# Patient Record
Sex: MALE | Race: WHITE | NOT HISPANIC OR LATINO | Employment: FULL TIME | URBAN - METROPOLITAN AREA
[De-identification: names, ages, dates, MRNs, and addresses within clinical notes are randomized per-mention and may not be internally consistent; named-entity substitution may affect disease eponyms.]

---

## 2021-12-15 ENCOUNTER — HISTORICAL (OUTPATIENT)
Dept: RADIOLOGY | Facility: HOSPITAL | Age: 41
End: 2021-12-15

## 2023-11-28 DIAGNOSIS — M54.50 LOW BACK PAIN: Primary | ICD-10-CM

## 2023-11-28 DIAGNOSIS — M54.16 LUMBAR RADICULOPATHY: ICD-10-CM

## 2023-11-30 ENCOUNTER — TELEPHONE (OUTPATIENT)
Dept: NEUROSURGERY | Facility: HOSPITAL | Age: 43
End: 2023-11-30
Payer: OTHER GOVERNMENT

## 2023-11-30 NOTE — TELEPHONE ENCOUNTER
This patient is being referred to Dr. Cornell by Joaquin España NP for low back pain x2 1/2 weeks. This LBP is localized to lower back area only. He describes this pain as aching. It was of gradual onset not fawn to any accidents, but it has worsened over time. Pt states the pain is of mild-moderate intensity. He had a MRI Lumbar done at [facility] and those images are in his chart. He has had some PT done at [facility] RONNIE has been emailed. He has not had any surgeries or pain mgmt. He is now ready to be scheduled next available w/ Dr. Cornell. Pt is aware we are OON w/  .

## 2023-12-11 NOTE — TELEPHONE ENCOUNTER
I spoke with the patient in regards to the medical request form that is supposed to be emailed back. He stated he does have it printed so he just needs to sign it and scan it back to us. I did advise for him to get it to us at his soonest convenience so I can obtain PT notes prior to his upcoming appointment.

## 2023-12-14 ENCOUNTER — OFFICE VISIT (OUTPATIENT)
Dept: NEUROSURGERY | Facility: CLINIC | Age: 43
End: 2023-12-14
Payer: OTHER GOVERNMENT

## 2023-12-14 ENCOUNTER — HOSPITAL ENCOUNTER (OUTPATIENT)
Dept: RADIOLOGY | Facility: HOSPITAL | Age: 43
Discharge: HOME | End: 2023-12-14
Attending: NEUROLOGICAL SURGERY
Payer: OTHER GOVERNMENT

## 2023-12-14 VITALS
HEART RATE: 88 BPM | SYSTOLIC BLOOD PRESSURE: 125 MMHG | WEIGHT: 261 LBS | RESPIRATION RATE: 16 BRPM | HEIGHT: 69 IN | BODY MASS INDEX: 38.66 KG/M2 | DIASTOLIC BLOOD PRESSURE: 77 MMHG

## 2023-12-14 DIAGNOSIS — M54.41 CHRONIC BILATERAL LOW BACK PAIN WITH RIGHT-SIDED SCIATICA: ICD-10-CM

## 2023-12-14 DIAGNOSIS — M51.26 LUMBAR DISC HERNIATION: Primary | ICD-10-CM

## 2023-12-14 DIAGNOSIS — G89.29 CHRONIC BILATERAL LOW BACK PAIN WITH RIGHT-SIDED SCIATICA: ICD-10-CM

## 2023-12-14 DIAGNOSIS — M51.26 LUMBAR DISC HERNIATION: ICD-10-CM

## 2023-12-14 PROCEDURE — 72114 X-RAY EXAM L-S SPINE BENDING: CPT | Mod: TC

## 2023-12-14 PROCEDURE — 99204 OFFICE O/P NEW MOD 45 MIN: CPT | Mod: ,,, | Performed by: NEUROLOGICAL SURGERY

## 2023-12-14 PROCEDURE — 99204 PR OFFICE/OUTPT VISIT, NEW, LEVL IV, 45-59 MIN: ICD-10-PCS | Mod: ,,, | Performed by: NEUROLOGICAL SURGERY

## 2023-12-14 RX ORDER — BUPROPION HYDROCHLORIDE 100 MG/1
TABLET, FILM COATED ORAL
COMMUNITY
Start: 2021-12-10

## 2023-12-14 RX ORDER — ONDANSETRON 4 MG/1
4 TABLET, ORALLY DISINTEGRATING ORAL EVERY 6 HOURS PRN
COMMUNITY
Start: 2023-08-06 | End: 2023-12-14

## 2023-12-14 RX ORDER — AMOXICILLIN 875 MG/1
875 TABLET, FILM COATED ORAL 2 TIMES DAILY
COMMUNITY
Start: 2023-08-06 | End: 2023-12-14

## 2023-12-14 RX ORDER — NALTREXONE HYDROCHLORIDE AND BUPROPION HYDROCHLORIDE 8; 90 MG/1; MG/1
TABLET, EXTENDED RELEASE ORAL
COMMUNITY
Start: 2023-05-10

## 2023-12-14 NOTE — PROGRESS NOTES
[facility]   Neurosurgery      Lincoln Clint Cheng  MRN: [id number], CSN: [id number]      : 1980   Age: 43 y.o. male  Payor:  / Plan:  GENERIC EAST / Product Type: Government /       Ref:  Joaquin España NP  [address] [place] [address] [place] [id number]    PCP: Joaquin España NP    Visit Date: 2023     There is no problem list on file for this patient.      SUBJECTIVE:      CC:   Chief Complaint   Patient presents with    Intermittent low back pain with no pain today       HPI:   Mr. Cheng is a 43 y.o. male who has a past medical history significant for anxiety.  He presents as a new patient in the neurosurgery clinic for episodic low back pain with radiation down his right leg, but he does not have any discomfort today.    The patient visits the neurosurgery clinic with his wife.  Mr. Cheng reports that his first episode of severe low back pain occurred in  when he was lifting weights the gym.  He had low back pain for a few days, which improved after his primary care provider administered a cortisone shot.  Mr. Cheng did not return to the gym because the COVID epidemic followed shortly thereafter.  Approximately 1 year later in 2020, he started lifting again and after a dead lift, had significant low back pain with radiation down his right leg.  This pain was associated with right anterior hip pain, right leg numbness, subjective right leg weakness, and limping while walking.  In between these episodes of low back pain, Mr. Cheng has no discomfort, as is the case is today.  In the last year, he had 3 episodes of severe low back pain.  There have been no reports of bowel or bladder incontinence.    There is increased pain with lifting and impact exercises such as running.  There is a reduction of pain with stretching and when taking Robaxin.  The patient has also tried taking Aleve.  He participated in physical therapy at [facility] in [facility]  [facility] in 2020, but not more recently.  Mr. Cheng has not been evaluated by a spine surgeon or a pain management specialist in the past.      There are no problems to display for this patient.    Past Medical History:   Diagnosis Date    Anxiety disorder, unspecified     Low back pain     Lumbar radiculopathy      Past Surgical History:   Procedure Laterality Date    CYST REMOVAL         Current Outpatient Medications:     CONTRAVE 8-90 mg TbSR, , Disp: , Rfl:     WELLBUTRIN  mg TBSR 12 hr tablet, , Disp: , Rfl:     Review of patient's allergies indicates:  No Known Allergies    Social History     Tobacco Use    Smoking status: Never    Smokeless tobacco: Never   Substance Use Topics    Alcohol use: Not on file     Occupation: Principal at [employer], 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   HENT:  Negative for congestion and sore throat.    Eyes:  Negative for blurred vision and double vision.   Respiratory:  Negative for cough and shortness of breath.    Cardiovascular:  Negative for chest pain and palpitations.   Gastrointestinal:  Negative for constipation, diarrhea, nausea and vomiting.   Musculoskeletal:  Positive for back pain, Negative for neck pain.   Neurological:  Positive for sensory change and focal weakness, Negative for headaches.   Endo/Heme/Allergies:  Does not bruise/bleed easily.   Psychiatric/Behavioral:  Positive for anxiety, Negative for depression.      OBJECTIVE:     EXAMINATION:  /77   Pulse 88   Resp 16   Ht 5' 9" (1.753 m)   Wt 118.4 kg (261 lb)   BMI 38.54 kg/m²   Body Habitus: Significantly Obese    Physical Exam:  Constitutional: The patient is well-developed and cooperative, sitting comfortably in a chair    Mental Status:   Oriented to person, place, and time  Normal " speech    Cranial nerves:  CN II: PERRL, 4 to 3 mm, brisk bilaterally  CN III, IV, and VI: extraocular movements normal, no ptosis  CN V: normal facial sensation and masseter function  CN VII: facial strength normal and symmetrical  CN VIII: hearing normal bilaterally  CN IX and X: swallowing and phonation normal  CN XI: shoulder shrug intact bilaterally  CN XII: tongue protrusion midline    Motor:  Muscle bulk: normal in all extremities  Tone: normal in all extremities    Upper extremities:  Deltoid: right 5/5; left 5/5  Biceps: right 5/5; left 5/5  Triceps: right 5/5; left 5/5  : right 5/5; left 5/5  Interosseous muscles: right 5/5; left 5/5    Lower extremities:  Hip flexors: right 5/5; left 5/5  Knee extensors: right 5/5; left 5/5  Knee flexors: right 5/5; left 5/5  Foot dorsiflexors: right 5/5; left 5/5  Foot plantar flexors: right 5/5; left 5/5  Extensor hallucis longus: right 5/5; left 5/5    Sensation:  Normal to light touch x 4 extremities    Reflexes:  Biceps: right 0/4; left 0/4  Brachioradialis: right 0/4; left 0/4  Triceps: right 0/4; left 0/4  Knee: right 0/4; left 0/4  Ankle: right 0/4; left 0/4    Dooleys sign: right negative; left negative  Babinski: right downgoing; left downgoing  Clonus: right negative; left negative    Coordination:  Finger to nose: right normal; left normal    Musculoskeletal:    Gait:  Toe walk- normal  Heel walk- normal  Tandem gait- normal    Straight leg test: right negative; left negative    Cervical: No pain with palpation  Upper back: No pain with palpation  Lower back: No pain with palpation      DIAGNOSTICS REVIEW OF IMAGING, LAB & OTHER STUDIES:  I have personally reviewed and evaluated the following reports as well as radiographic studies:    MRI lumbar spine without gadolinium, 10/31/2023- there is normal alignment.  At L3-4, there is mild right neuroforaminal narrowing.  At L4-5, there is a mild right disc bulge with mild right neuroforaminal  narrowing.      ASSESSMENT:  Mr. Cheng is a 43 y.o. male who has a past medical history significant for anxiety.  He presents as a new patient in the neurosurgery clinic for episodic low back pain with radiation down his right leg, but he does not have any discomfort today.  Mr. Cheng has a normal motor and sensory neurological exam.    I reviewed pertinent imaging studies with the patient and his wife.  A MRI lumbar spine without gadolinium demonstrates normal alignment.  At L3-4, there is mild right neuroforaminal narrowing.  At L4-5, there is a mild right disc bulge with mild right neuroforaminal narrowing.        PLAN:    Encounter Diagnoses   Name Primary?    Lumbar disc herniation Yes    Chronic bilateral low back pain with right-sided sciatica      Orders Placed This Encounter   Procedures    X-Ray Lumbar Complete Including Flex And Ext    Ambulatory referral/consult to Physical/Occupational Therapy        1. I discussed with Mr. Cheng that continued optimization of medical management is recommended for his episodic low back pain, which is attributed to musculoskeletal spasms and likely lumbar nerve root irritation from a right L4-5 herniated disc.  He is very agreeable to this nonsurgical plan of care.      2.  To further evaluate his spinal anatomy, I am ordering lumbar spine x-rays with AP lateral and flexion-extension views. The patient will be contacted with these radiographic results and any updates to the plan of care.    3.  Mr. Cheng is being referred to physical therapy at [facility] in [place], [place].    4.   A consideration will be made to refer Mr. Cheng to a pain management specialist for evaluation and consideration of lumbar epidural steroid injection for an acute episode of low back pain.  It is noted that his referral will need to be submitted to a clinic that is within a network for [facility].      5.  The patient's BMI is elevated at 38.54.   He is currently taking  Contrave.  We discussed weight loss goals with diet and exercise.  Maintaining a healthy weight and strengthening core muscles are important factors for reducing pain along the spine.     6.  Mr. Cheng is encouraged to follow up in the neurosurgery clinic on an as-needed basis for any concerning changes in condition or symptoms.        This note will be sent to the patient's referring provider Joaquin España and primary care provider Joaquin España NP.           Natalie Cornell MD  Neurosurgeon

## 2023-12-14 NOTE — PATIENT INSTRUCTIONS
Mr. Cheng is a 43 y.o. male who has a past medical history significant for anxiety.  He presents as a new patient in the neurosurgery clinic for episodic low back pain with radiation down his right leg, but he does not have any discomfort today.  Mr. Cheng has a normal motor and sensory neurological exam.    I reviewed pertinent imaging studies with the patient and his wife.  A MRI lumbar spine without gadolinium demonstrates normal alignment.  At L3-4, there is mild right neuroforaminal narrowing.  At L4-5, there is a mild right disc bulge with mild right neuroforaminal narrowing.        PLAN:    Encounter Diagnoses   Name Primary?    Lumbar disc herniation Yes    Chronic bilateral low back pain with right-sided sciatica      Orders Placed This Encounter   Procedures    X-Ray Lumbar Complete Including Flex And Ext    Ambulatory referral/consult to Physical/Occupational Therapy        1. I discussed with Mr. Cheng that continued optimization of medical management is recommended for his episodic low back pain, which is attributed to musculoskeletal spasms and likely lumbar nerve root irritation from a right L4-5 herniated disc.  He is very agreeable to this nonsurgical plan of care.      2.  To further evaluate his spinal anatomy, I am ordering lumbar spine x-rays with AP lateral and flexion-extension views. The patient will be contacted with these radiographic results and any updates to the plan of care.    3.  Mr. Cheng is being referred to physical therapy at [facility] in [place], [place].    4.   A consideration will be made to refer Mr. Cheng to a pain management specialist for evaluation and consideration of lumbar epidural steroid injection for an acute episode of low back pain.  It is noted that his referral will need to be submitted to a clinic that is within a network for [facility].      5.  The patient's BMI is elevated at 38.54.   He is currently taking Contrave.  We discussed  weight loss goals with diet and exercise.  Maintaining a healthy weight and strengthening core muscles are important factors for reducing pain along the spine.     6.  Mr. Cheng is encouraged to follow up in the neurosurgery clinic on an as-needed basis for any concerning changes in condition or symptoms.        This note will be sent to the patient's referring provider Joaquin España and primary care provider Joaquin España NP.

## 2023-12-17 ENCOUNTER — TELEPHONE (OUTPATIENT)
Dept: NEUROSURGERY | Facility: CLINIC | Age: 43
End: 2023-12-17
Payer: OTHER GOVERNMENT

## 2023-12-17 NOTE — TELEPHONE ENCOUNTER
I am requesting VICTORINA Barber to contact Mr. Cheng.    I reviewed the patient's lumbar spine x-ray images that were completed on 12/14/2023.      These lumbar spine x-rays demonstrate trace retrolisthesis of L1 on L2, L2 on L3, and L4 on L5.  There is degenerative disc disease at L5-S1 with no motion on flexion-extension views.        With this updated radiographic result, my recommendations are as follows:    1.  Continued optimization of medical management is recommended for Mr. Cheng's episodic low back pain, which is attributed to musculoskeletal spasms and likely lumbar nerve root irritation from a right L4-5 herniated disc.      2.  I would like to confirm that the patient will be initiating physical therapy at [facility] in [place], [place].     3.   A consideration will be made to refer Mr. Cheng to a pain management specialist in the future for evaluation and consideration of lumbar epidural steroid injection for an acute episode of low back pain.      4.  He is encouraged to continue with weight loss goals.     5.  Mr. Cheng is appropriate for following up in the neurosurgery clinic on an as-needed basis for any concerning changes in condition or symptoms.

## 2023-12-19 NOTE — TELEPHONE ENCOUNTER
I spoke to the patient.  We discussed the x-rays as well as plan moving forward.  He has not start therapy.  He was waiting for them to contact him.  I asked him to give [facility] a call.  He voiced understanding about the plan.  He will return on an as needed basis.

## 2023-12-27 ENCOUNTER — TELEPHONE (OUTPATIENT)
Dept: NEUROSURGERY | Facility: CLINIC | Age: 43
End: 2023-12-27
Payer: OTHER GOVERNMENT

## 2023-12-27 NOTE — TELEPHONE ENCOUNTER
I tried to call the patient to see if he has heard from [facility] regarding an appointment. He did not answer so I LMOM.

## 2024-06-14 ENCOUNTER — HOSPITAL ENCOUNTER (OUTPATIENT)
Dept: RADIOLOGY | Facility: HOSPITAL | Age: 44
Discharge: HOME | End: 2024-06-14
Attending: PHYSICAL MEDICINE & REHABILITATION
Payer: OTHER GOVERNMENT

## 2024-06-14 DIAGNOSIS — S39.012A BACK STRAIN: ICD-10-CM

## 2024-06-14 DIAGNOSIS — S39.012A BACK STRAIN: Primary | ICD-10-CM

## 2024-06-14 PROCEDURE — 72100 X-RAY EXAM L-S SPINE 2/3 VWS: CPT | Mod: TC
